# Patient Record
Sex: MALE | Race: WHITE | NOT HISPANIC OR LATINO | Employment: OTHER | URBAN - METROPOLITAN AREA
[De-identification: names, ages, dates, MRNs, and addresses within clinical notes are randomized per-mention and may not be internally consistent; named-entity substitution may affect disease eponyms.]

---

## 2017-08-30 NOTE — PATIENT DISCUSSION
Surgery Counseling: I have discussed the option of scheduling surgery versus following, as well as the risks, benefits and alternatives of cataract surgery with the patient. It was explained that the surgery is medically indicated at this time, and it can be performed at the patient's option as delaying will cause no further deterioration, therefore there is no rush and there is no harm in waiting to have surgery. It was also explained that there is no guarantee that removing the cataract will improve their vision. The patient understands and desires to proceed with cataract surgery with the implantation of an intraocular lens to improve vision for ____reading and tv____________. I have given the patient the prescribed regimen of the all-in-one drop to use before and after cataract surgery. They have elected to use the all-in-one option of Pred/Gati/Brom(prednisolone acetate,gatifloxacin,and bromfenac. Patient to administer as directed.

## 2017-08-30 NOTE — PATIENT DISCUSSION
CATARACTS, OU - VISUALLY SIGNIFICANT. SCHEDULE _od_ FIRST THEN LATER IN  os__ DISCUSSED OPTION OF __STD______________VS ___STD/MANUAL LRI_______________. PATIENT UNDERSTANDS AND DESIRES _________WILL LET US KNOW____________________.

## 2017-10-02 NOTE — PATIENT DISCUSSION
Continue: prednisolone acetate (prednisolone acetate): drops,suspension: 1% 1 drop four times a day as directed into right eye

## 2017-10-02 NOTE — PATIENT DISCUSSION
Pre-Op 2nd Eye Counseling: The patient has noticed an improvement in their visual symptoms in the operative eye. The patient complains of decreased vision in the fellow eye when ____TV___________________. It was explained to the patient that the decision to proceed with cataract surgery in the fellow eye is entirely a separate decision from the surgical eye. All of the same risks, benefits and alternatives are reviewed with the patient again. The patient does feel the vision in the non-operative eye is limiting their daily activities and elects to proceed with cataract surgery in the __LEFT_____ eye. . I have given the patient the prescribed regimen of  drops to use before and after cataract surgery. Patient to administer as directed.

## 2017-10-02 NOTE — PATIENT DISCUSSION
S/P PE IOL, _OD__. DOING WELL. CONTINUE PRED-GATI-BROM IN THE SURGICAL EYE  FOR A TOTAL OF 3 WEEKS USE THEN DISCONTINUE. SCHEDULE 2ND EYE CATARACT SURGERY.

## 2018-06-11 NOTE — PATIENT DISCUSSION
Pre-Op 2nd Eye Yag Counseling: The patient has noticed an improvement in their visual symptoms in the operative eye. The patient complains of decreased vision in the fellow eye when [address] [id number]. It was explained to the patient that the decision to proceed with Yag laser in the fellow eye is entirely a separate decision from the surgical eye. All of the same risks, benefits and alternatives ere reviewed with the patient again. The patient does feel the vision in the non-operative eye is limiting their daily activities and elects to proceed with Yag laser in the RIGHT eye.

## 2018-10-18 ENCOUNTER — ESTABLISHED COMPREHENSIVE EXAM (OUTPATIENT)
Dept: URBAN - METROPOLITAN AREA CLINIC 43 | Facility: CLINIC | Age: 68
End: 2018-10-18

## 2018-10-18 DIAGNOSIS — H52.03: ICD-10-CM

## 2018-10-18 DIAGNOSIS — H52.203: ICD-10-CM

## 2018-10-18 PROCEDURE — 92015 DETERMINE REFRACTIVE STATE: CPT

## 2018-10-18 PROCEDURE — 92014 COMPRE OPH EXAM EST PT 1/>: CPT

## 2018-10-18 ASSESSMENT — TONOMETRY
OS_IOP_MMHG: 15
OD_IOP_MMHG: 16

## 2018-10-18 ASSESSMENT — VISUAL ACUITY
OS_SC: 20/50-1
OD_CC: J1
OD_SC: J4
OD_SC: 20/40-1
OS_SC: J10
OS_CC: J1

## 2019-01-28 NOTE — PATIENT DISCUSSION
EPIPHORA: I have discussed with the patient that the cause of tearing and pooling of tears may be due to tear duct being clogged or obstructed. Discussed options with patient of probing and irrigation versus following. Patient understands and wishes to follow at this time and will call the office if condition worsens.

## 2019-03-21 NOTE — PATIENT DISCUSSION
DRY EYES : Discussed with patient the importance of keeping the eye moist and the symptoms associated with dry eyes including blurry vision, tearing, burning, and sharon sensation. Advised patient to minimize use of any fans blowing directly on the face. Advised patient to continue with artificial tears 2-3 times daily.

## 2019-05-02 NOTE — PATIENT DISCUSSION
EPIPHORA: I have discussed with the patient that the cause of tearing and pooling of tears may be due to tear duct being clogged or obstructed. Discussed options with patient of punctal plasty versus following. The risks, benefits, alternatives include anesthesia, bleeding, infection, inflammation. The patient understands and wishes to proceed with punctal plasty to improve tearing. An Rx was given for Maxitrol 1 drop 3 times a day for one week, then 2 times a day for one week.

## 2019-05-02 NOTE — PATIENT DISCUSSION
New Prescription: Maxitrol (neomycin-polymyxin-dexameth): drops,suspension: 3.5-10,000-0.1 mg/mL-unit/mL-% 1 drop as directed as directed into both eyes 05-

## 2019-06-18 NOTE — PATIENT DISCUSSION
New Prescription: erythromycin (erythromycin): ointment: 5 mg/gram (0.5 %) a small amount as directed as directed into both eyes 06-

## 2019-07-08 NOTE — PATIENT DISCUSSION
LIDS POSTOP: ALL LOOKS GOOD. USE ARTIFICIAL TEARS FOR ANY DISCOMFORT. CAN STOP OINTMENT IF HAVEN'T ALREADY.  CAN USE MEDERMA FOR ANY SCARRING

## 2019-07-08 NOTE — PATIENT DISCUSSION
Continue: erythromycin (erythromycin): ointment: 5 mg/gram (0.5 %) a small amount as directed as directed into both eyes 06-

## 2020-10-20 ENCOUNTER — ESTABLISHED COMPREHENSIVE EXAM (OUTPATIENT)
Dept: URBAN - METROPOLITAN AREA CLINIC 43 | Facility: CLINIC | Age: 70
End: 2020-10-20

## 2020-10-20 DIAGNOSIS — H52.4: ICD-10-CM

## 2020-10-20 DIAGNOSIS — H25.13: ICD-10-CM

## 2020-10-20 DIAGNOSIS — H52.203: ICD-10-CM

## 2020-10-20 DIAGNOSIS — H52.03: ICD-10-CM

## 2020-10-20 PROCEDURE — 92015 DETERMINE REFRACTIVE STATE: CPT

## 2020-10-20 PROCEDURE — 92014 COMPRE OPH EXAM EST PT 1/>: CPT

## 2020-10-20 ASSESSMENT — VISUAL ACUITY
OD_CC: J1
OS_SC: 20/60-2
OS_SC: J10-
OD_SC: J6-
OS_CC: J2-
OS_PH: 20/30-1
OD_SC: 20/30-1+1

## 2020-10-20 ASSESSMENT — TONOMETRY
OS_IOP_MMHG: 19
OD_IOP_MMHG: 19

## 2021-10-19 ENCOUNTER — ESTABLISHED COMPREHENSIVE EXAM (OUTPATIENT)
Dept: URBAN - METROPOLITAN AREA CLINIC 43 | Facility: CLINIC | Age: 71
End: 2021-10-19

## 2021-10-19 DIAGNOSIS — H25.13: ICD-10-CM

## 2021-10-19 DIAGNOSIS — H35.371: ICD-10-CM

## 2021-10-19 DIAGNOSIS — Z98.890: ICD-10-CM

## 2021-10-19 PROCEDURE — 92015 DETERMINE REFRACTIVE STATE: CPT

## 2021-10-19 PROCEDURE — 92014 COMPRE OPH EXAM EST PT 1/>: CPT

## 2021-10-19 ASSESSMENT — TONOMETRY
OD_IOP_MMHG: 18
OS_IOP_MMHG: 17

## 2021-10-19 ASSESSMENT — VISUAL ACUITY
OD_SC: 20/30-1
OS_SC: 20/80-1+1
OD_CC: 20/30-1
OS_SC: J12
OS_CC: J2
OS_CC: 20/20-1
OS_BAT: 20/100+1
OD_SC: J8
OD_CC: J1
OD_BAT: 20/100+1

## 2022-07-25 NOTE — PATIENT DISCUSSION
General: Carac Pregnancy And Lactation Text: This medication is Pregnancy Category X and contraindicated in pregnancy and in women who may become pregnant. It is unknown if this medication is excreted in breast milk.

## 2022-10-25 ENCOUNTER — COMPREHENSIVE EXAM (OUTPATIENT)
Dept: URBAN - METROPOLITAN AREA CLINIC 43 | Facility: CLINIC | Age: 72
End: 2022-10-25

## 2022-10-25 DIAGNOSIS — H35.371: ICD-10-CM

## 2022-10-25 DIAGNOSIS — H25.13: ICD-10-CM

## 2022-10-25 DIAGNOSIS — Z98.890: ICD-10-CM

## 2022-10-25 DIAGNOSIS — H02.833: ICD-10-CM

## 2022-10-25 DIAGNOSIS — H02.836: ICD-10-CM

## 2022-10-25 DIAGNOSIS — H02.403: ICD-10-CM

## 2022-10-25 PROCEDURE — 92014 COMPRE OPH EXAM EST PT 1/>: CPT

## 2022-10-25 PROCEDURE — 92015 DETERMINE REFRACTIVE STATE: CPT

## 2022-10-25 ASSESSMENT — VISUAL ACUITY
OS_PH: 20/30-2
OD_SC: 20/30-2
OD_CC: J1
OS_SC: 20/60-1
OS_SC: J12
OD_SC: J4+
OS_CC: J1

## 2022-10-25 ASSESSMENT — TONOMETRY
OD_IOP_MMHG: 17
OS_IOP_MMHG: 18

## 2022-10-27 ENCOUNTER — CONSULTATION/EVALUATION (OUTPATIENT)
Dept: URBAN - METROPOLITAN AREA CLINIC 44 | Facility: CLINIC | Age: 72
End: 2022-10-27

## 2022-10-27 DIAGNOSIS — H02.413: ICD-10-CM

## 2022-10-27 PROCEDURE — 92285 EXTERNAL OCULAR PHOTOGRAPHY: CPT

## 2022-10-27 PROCEDURE — 99213 OFFICE O/P EST LOW 20 MIN: CPT

## 2022-11-10 ENCOUNTER — DIAGNOSTICS ONLY (OUTPATIENT)
Dept: URBAN - METROPOLITAN AREA CLINIC 43 | Facility: CLINIC | Age: 72
End: 2022-11-10

## 2022-11-10 DIAGNOSIS — H35.371: ICD-10-CM

## 2022-11-10 DIAGNOSIS — Z98.890: ICD-10-CM

## 2022-11-10 DIAGNOSIS — H25.13: ICD-10-CM

## 2022-11-10 DIAGNOSIS — H02.833: ICD-10-CM

## 2022-11-10 DIAGNOSIS — H02.413: ICD-10-CM

## 2022-11-10 DIAGNOSIS — H02.836: ICD-10-CM

## 2022-11-10 DIAGNOSIS — H02.403: ICD-10-CM

## 2022-11-10 PROCEDURE — 99211T TECH SERVICE

## 2023-01-26 ENCOUNTER — PRE-OP/H&P (OUTPATIENT)
Dept: URBAN - METROPOLITAN AREA CLINIC 44 | Facility: CLINIC | Age: 73
End: 2023-01-26

## 2023-01-26 DIAGNOSIS — H02.403: ICD-10-CM

## 2023-01-26 DIAGNOSIS — H02.413: ICD-10-CM

## 2023-01-26 PROCEDURE — 99211HP H&P OFFICE/OUTPATIENT VISIT, EST

## 2023-12-21 ENCOUNTER — COMPREHENSIVE EXAM (OUTPATIENT)
Dept: URBAN - METROPOLITAN AREA CLINIC 43 | Facility: CLINIC | Age: 73
End: 2023-12-21

## 2023-12-21 DIAGNOSIS — H02.836: ICD-10-CM

## 2023-12-21 DIAGNOSIS — H35.371: ICD-10-CM

## 2023-12-21 DIAGNOSIS — H02.833: ICD-10-CM

## 2023-12-21 DIAGNOSIS — Z98.890: ICD-10-CM

## 2023-12-21 DIAGNOSIS — H25.13: ICD-10-CM

## 2023-12-21 PROCEDURE — 92014 COMPRE OPH EXAM EST PT 1/>: CPT

## 2023-12-21 PROCEDURE — 92015 DETERMINE REFRACTIVE STATE: CPT

## 2023-12-21 ASSESSMENT — VISUAL ACUITY
OS_SC: 20/30-2
OD_SC: 20/30-1
OD_SC: J6
OS_SC: J10

## 2023-12-21 ASSESSMENT — TONOMETRY
OD_IOP_MMHG: 19
OS_IOP_MMHG: 18

## 2025-01-14 ENCOUNTER — COMPREHENSIVE EXAM (OUTPATIENT)
Age: 75
End: 2025-01-14

## 2025-01-14 DIAGNOSIS — H25.13: ICD-10-CM

## 2025-01-14 DIAGNOSIS — H02.836: ICD-10-CM

## 2025-01-14 DIAGNOSIS — Z98.890: ICD-10-CM

## 2025-01-14 DIAGNOSIS — H35.371: ICD-10-CM

## 2025-01-14 DIAGNOSIS — H02.833: ICD-10-CM

## 2025-01-14 PROCEDURE — 92014 COMPRE OPH EXAM EST PT 1/>: CPT

## 2025-01-14 PROCEDURE — 92015 DETERMINE REFRACTIVE STATE: CPT
